# Patient Record
Sex: MALE | Race: WHITE | NOT HISPANIC OR LATINO | URBAN - METROPOLITAN AREA
[De-identification: names, ages, dates, MRNs, and addresses within clinical notes are randomized per-mention and may not be internally consistent; named-entity substitution may affect disease eponyms.]

---

## 2024-04-25 ENCOUNTER — OFFICE VISIT (OUTPATIENT)
Dept: PRIMARY CARE | Facility: CLINIC | Age: 39
End: 2024-04-25
Payer: COMMERCIAL

## 2024-04-25 VITALS
DIASTOLIC BLOOD PRESSURE: 70 MMHG | SYSTOLIC BLOOD PRESSURE: 142 MMHG | TEMPERATURE: 97.6 F | HEART RATE: 55 BPM | WEIGHT: 152 LBS | HEIGHT: 68 IN | OXYGEN SATURATION: 98 % | BODY MASS INDEX: 23.04 KG/M2

## 2024-04-25 DIAGNOSIS — F41.9 ANXIETY: ICD-10-CM

## 2024-04-25 DIAGNOSIS — N52.9 ERECTILE DYSFUNCTION, UNSPECIFIED ERECTILE DYSFUNCTION TYPE: Primary | ICD-10-CM

## 2024-04-25 DIAGNOSIS — Z31.41 FERTILITY TESTING: ICD-10-CM

## 2024-04-25 PROCEDURE — 99203 OFFICE O/P NEW LOW 30 MIN: CPT | Performed by: FAMILY MEDICINE

## 2024-04-25 PROCEDURE — 1036F TOBACCO NON-USER: CPT | Performed by: FAMILY MEDICINE

## 2024-04-25 RX ORDER — BUSPIRONE HYDROCHLORIDE 7.5 MG/1
7.5 TABLET ORAL 2 TIMES DAILY
Qty: 60 TABLET | Refills: 2 | Status: SHIPPED | OUTPATIENT
Start: 2024-04-25

## 2024-04-25 ASSESSMENT — ANXIETY QUESTIONNAIRES
GAD7 TOTAL SCORE: 6
3. WORRYING TOO MUCH ABOUT DIFFERENT THINGS: SEVERAL DAYS
5. BEING SO RESTLESS THAT IT IS HARD TO SIT STILL: SEVERAL DAYS
2. NOT BEING ABLE TO STOP OR CONTROL WORRYING: SEVERAL DAYS
1. FEELING NERVOUS, ANXIOUS, OR ON EDGE: SEVERAL DAYS
6. BECOMING EASILY ANNOYED OR IRRITABLE: SEVERAL DAYS
4. TROUBLE RELAXING: SEVERAL DAYS
IF YOU CHECKED OFF ANY PROBLEMS ON THIS QUESTIONNAIRE, HOW DIFFICULT HAVE THESE PROBLEMS MADE IT FOR YOU TO DO YOUR WORK, TAKE CARE OF THINGS AT HOME, OR GET ALONG WITH OTHER PEOPLE: NOT DIFFICULT AT ALL
7. FEELING AFRAID AS IF SOMETHING AWFUL MIGHT HAPPEN: NOT AT ALL

## 2024-04-25 ASSESSMENT — ENCOUNTER SYMPTOMS
OCCASIONAL FEELINGS OF UNSTEADINESS: 0
DEPRESSION: 0
LOSS OF SENSATION IN FEET: 0

## 2024-04-25 NOTE — ASSESSMENT & PLAN NOTE
Almost certainly secondary to anxiety. Check lipids due to FMHx father early CAD, and chest testosterone on his request.  
May consider BuSpar. He is open to trying this.  
Discharged

## 2024-04-25 NOTE — PATIENT INSTRUCTIONS
Monitor your resting blood pressure (BP) and heart rate (HR) at home. Resting BP should be fairly consistently better than 140/90, preferably better than 130/80. Please bring your blood pressure cuff to your appointments.  For a list of validated BP cuffs: https://www.validatebp.org/  Dermatology  Dr. Sharon Robins, Celestine Farfan CNP PhD, et al. ([place]) [phone]    [facility]  Dr. Irwin Rajan NP-C  Brisa Velasco PA-C  Zee Hunter FNP-C  [address] [place] [address]  [phone]    Dr. Jami Galo PA-C  Katlyn Choi NP-C  [address] [place], [place]  [phone]    [facility]  [address], [address]  [place] [address]  [phone])    [address], [address]  [place] [address]  [phone]    [address] [place] [address], [address]  [place], [place] [address] [phone]     Advise that your and your wife follow-up with reproductive endocrinology regarding fertility concerns.      Please return for a(n) follow-up appointment and Wellness visit in 2-3 months, after tests to review results and options, earlier if any question or concern.    Avoid taking Biotin for a week prior to any blood tests, as it can interfere with certain results. Fasting for labs means 12 hours, nothing to eat or drink, except water and medications, unless directed otherwise.    For assistance with scheduling referrals or consultations, please call [phone]. For laboratory, radiology, and other tests, please call [phone] ([phone] for pediatrics). Please review prescription inserts and published information for possible adverse effects of all medications. Return after testing or consultation to review results and recommendations, if symptoms persist, change, worsen, or return, or if you have any question or concern. If you do not get results within 7-10 days, or you have any question or concern, please send a message,  call the office ([phone]), or return to the office for a follow-up appointment. For non-emergencies, you may call the office. For emergencies, call 9-1-1 or go to the nearest Emergency Department. Please schedule additional appointment(s) to address concern(s) not addressed today.    In general, results are not released or discussed over the telephone, but at an appointment or via  [employer]. Results of tests done through [facility] are released via  [employer] (see below).  [email or web address]   [employer] support line: [phone]

## 2024-04-25 NOTE — PROGRESS NOTES
"Subjective   Patient ID: Devon Venegas is a 38 y.o. male who presents for Establish Care (Pt presents as a new pt to establish care, pt is concerned possible infertility, pt states he has a lot of anxiety that causes this, has not happened since Nov/Dec, no meds. BL).  HPI Historian(s): Self    Last PCP Dr. Fountain last seen 10-12y ago.  Saw urology in 2018.    He and wife trying to get pregnant for about a year. Is anxious about \"performing under pressure\" during his wife's fertile periods. c/o decreased libido. Has difficulty achieving erections only when intercourse is \"planned.\"      Review of Systems   All other systems reviewed and are negative.  Palpitations  Kidney, bladder, prostate problem  Frequent back pain  Nervousness  Take medication for nerves  Tired even after a good nights sleep  Fertility concerns      Objective   /70   Pulse 55   Temp 36.4 °C (97.6 °F)   Ht 1.727 m (5' 8\")   Wt 68.9 kg (152 lb)   SpO2 98%   BMI 23.11 kg/m²     Physical Exam  Vitals and nursing note reviewed.   Constitutional:       General: He is not in acute distress.     Appearance: He is not diaphoretic.   HENT:      Head: Normocephalic and atraumatic.   Cardiovascular:      Rate and Rhythm: Normal rate and regular rhythm.      Heart sounds: Normal heart sounds.   Pulmonary:      Effort: Pulmonary effort is normal.      Breath sounds: Normal breath sounds.   Abdominal:      General: Bowel sounds are normal. There is no distension.      Palpations: Abdomen is soft. There is no mass.      Tenderness: There is no abdominal tenderness. There is no guarding or rebound.   Genitourinary:     Penis: Normal and circumcised.       Testes: Normal.      Epididymis:      Right: Normal.      Left: Normal.      Dinesh stage (genital): 5.   Musculoskeletal:      Right lower leg: No edema.      Left lower leg: No edema.   Skin:     General: Skin is warm and dry.   Neurological:      General: No focal deficit present.      Mental " Status: He is alert. Mental status is at baseline.   Psychiatric:         Mood and Affect: Mood is anxious.         Thought Content: Thought content normal.         Assessment/Plan   Problem List Items Addressed This Visit       Anxiety     May consider BuSpar. He is open to trying this.         Relevant Medications    busPIRone (Buspar) 7.5 mg tablet    Other Relevant Orders    Follow Up In Primary Care - Health Maintenance    Erectile dysfunction - Primary     Almost certainly secondary to anxiety. Check lipids due to FMHx father early CAD, and chest testosterone on his request.         Relevant Orders    Lipid Panel    Comprehensive Metabolic Panel    CBC    Follow Up In Primary Care - Health Maintenance    Fertility testing    Relevant Orders    Testosterone    FSH    Luteinizing Hormone    POCT Semen Analysis Complete with Strict Morphology    Follow Up In Primary Care - Health Maintenance

## 2024-04-30 ENCOUNTER — LAB (OUTPATIENT)
Dept: LAB | Facility: LAB | Age: 39
End: 2024-04-30
Payer: COMMERCIAL

## 2024-04-30 DIAGNOSIS — Z31.41 FERTILITY TESTING: ICD-10-CM

## 2024-04-30 DIAGNOSIS — N52.9 ERECTILE DYSFUNCTION, UNSPECIFIED ERECTILE DYSFUNCTION TYPE: ICD-10-CM

## 2024-04-30 LAB
ALBUMIN SERPL BCP-MCNC: 4.9 G/DL (ref 3.4–5)
ALP SERPL-CCNC: 56 U/L (ref 33–120)
ALT SERPL W P-5'-P-CCNC: 19 U/L (ref 10–52)
ANION GAP SERPL CALC-SCNC: 14 MMOL/L (ref 10–20)
AST SERPL W P-5'-P-CCNC: 22 U/L (ref 9–39)
BILIRUB SERPL-MCNC: 0.7 MG/DL (ref 0–1.2)
BUN SERPL-MCNC: 14 MG/DL (ref 6–23)
CALCIUM SERPL-MCNC: 9.6 MG/DL (ref 8.6–10.3)
CHLORIDE SERPL-SCNC: 102 MMOL/L (ref 98–107)
CHOLEST SERPL-MCNC: 200 MG/DL (ref 0–199)
CHOLESTEROL/HDL RATIO: 4.1
CO2 SERPL-SCNC: 30 MMOL/L (ref 21–32)
CREAT SERPL-MCNC: 1.15 MG/DL (ref 0.5–1.3)
EGFRCR SERPLBLD CKD-EPI 2021: 85 ML/MIN/1.73M*2
ERYTHROCYTE [DISTWIDTH] IN BLOOD BY AUTOMATED COUNT: 12.9 % (ref 11.5–14.5)
FSH SERPL-ACNC: 3.6 IU/L
GLUCOSE SERPL-MCNC: 104 MG/DL (ref 74–99)
HCT VFR BLD AUTO: 47 % (ref 41–52)
HDLC SERPL-MCNC: 49.2 MG/DL
HGB BLD-MCNC: 15.5 G/DL (ref 13.5–17.5)
LDLC SERPL CALC-MCNC: 135 MG/DL
LH SERPL-ACNC: 3.2 IU/L
MCH RBC QN AUTO: 30.7 PG (ref 26–34)
MCHC RBC AUTO-ENTMCNC: 33 G/DL (ref 32–36)
MCV RBC AUTO: 93 FL (ref 80–100)
NON HDL CHOLESTEROL: 151 MG/DL (ref 0–149)
NRBC BLD-RTO: 0 /100 WBCS (ref 0–0)
PLATELET # BLD AUTO: 265 X10*3/UL (ref 150–450)
POTASSIUM SERPL-SCNC: 4.6 MMOL/L (ref 3.5–5.3)
PROT SERPL-MCNC: 7.3 G/DL (ref 6.4–8.2)
RBC # BLD AUTO: 5.05 X10*6/UL (ref 4.5–5.9)
SODIUM SERPL-SCNC: 141 MMOL/L (ref 136–145)
TESTOST SERPL-MCNC: 642 NG/DL (ref 240–1000)
TRIGL SERPL-MCNC: 81 MG/DL (ref 0–149)
VLDL: 16 MG/DL (ref 0–40)
WBC # BLD AUTO: 6.4 X10*3/UL (ref 4.4–11.3)

## 2024-04-30 PROCEDURE — 83002 ASSAY OF GONADOTROPIN (LH): CPT

## 2024-04-30 PROCEDURE — 36415 COLL VENOUS BLD VENIPUNCTURE: CPT

## 2024-04-30 PROCEDURE — 83001 ASSAY OF GONADOTROPIN (FSH): CPT

## 2024-04-30 PROCEDURE — 80053 COMPREHEN METABOLIC PANEL: CPT

## 2024-04-30 PROCEDURE — 84403 ASSAY OF TOTAL TESTOSTERONE: CPT

## 2024-04-30 PROCEDURE — 80061 LIPID PANEL: CPT

## 2024-04-30 PROCEDURE — 85027 COMPLETE CBC AUTOMATED: CPT

## 2024-07-23 ENCOUNTER — TELEPHONE (OUTPATIENT)
Dept: PRIMARY CARE | Facility: CLINIC | Age: 39
End: 2024-07-23
Payer: COMMERCIAL

## 2024-07-23 DIAGNOSIS — F41.9 ANXIETY: ICD-10-CM

## 2024-07-23 NOTE — TELEPHONE ENCOUNTER
MEDICATION REFILL    Pt only has about a day / day and a half left of the Rx.     Pharmacy Name:    [facility] / [place]    Medication requested                    Buspirone   7.5 mg   Dosage     1 tab 2 x daily  Quantity     90 days   Allergies    none given  Date of last visit   04/25/2024  Date of Next Appointment   11/06/2024

## 2024-07-24 RX ORDER — BUSPIRONE HYDROCHLORIDE 7.5 MG/1
7.5 TABLET ORAL 2 TIMES DAILY
Qty: 180 TABLET | Refills: 0 | Status: SHIPPED | OUTPATIENT
Start: 2024-07-24

## 2024-08-21 ENCOUNTER — APPOINTMENT (OUTPATIENT)
Dept: PRIMARY CARE | Facility: CLINIC | Age: 39
End: 2024-08-21
Payer: COMMERCIAL

## 2024-10-31 ENCOUNTER — TELEPHONE (OUTPATIENT)
Dept: PRIMARY CARE | Facility: CLINIC | Age: 39
End: 2024-10-31

## 2024-10-31 DIAGNOSIS — Z31.41 FERTILITY TESTING: Primary | ICD-10-CM

## 2024-10-31 NOTE — TELEPHONE ENCOUNTER
Pt was told at his last ov in July that Dr Duenas was going to order a POCT Semen Analysis Complete with Strict Morphology.  Can this order be put in the system by Dr Duenas, or does the pt need a referral to specialist?

## 2024-11-01 DIAGNOSIS — Z31.41 FERTILITY TESTING: Primary | ICD-10-CM

## 2024-11-06 ENCOUNTER — APPOINTMENT (OUTPATIENT)
Dept: PRIMARY CARE | Facility: CLINIC | Age: 39
End: 2024-11-06
Payer: COMMERCIAL

## 2024-11-06 VITALS
WEIGHT: 151.13 LBS | HEIGHT: 68 IN | OXYGEN SATURATION: 96 % | DIASTOLIC BLOOD PRESSURE: 72 MMHG | BODY MASS INDEX: 22.9 KG/M2 | SYSTOLIC BLOOD PRESSURE: 122 MMHG | HEART RATE: 67 BPM

## 2024-11-06 DIAGNOSIS — F41.9 ANXIETY: ICD-10-CM

## 2024-11-06 DIAGNOSIS — N52.9 ERECTILE DYSFUNCTION, UNSPECIFIED ERECTILE DYSFUNCTION TYPE: ICD-10-CM

## 2024-11-06 DIAGNOSIS — Z82.49 FAMILY HISTORY OF EARLY CAD: ICD-10-CM

## 2024-11-06 DIAGNOSIS — E78.00 HYPERCHOLESTEROLEMIA: ICD-10-CM

## 2024-11-06 DIAGNOSIS — Z31.41 FERTILITY TESTING: ICD-10-CM

## 2024-11-06 DIAGNOSIS — R73.01 IFG (IMPAIRED FASTING GLUCOSE): Primary | ICD-10-CM

## 2024-11-06 LAB — POC HEMOGLOBIN A1C: 5.5 % (ref 4.2–6.5)

## 2024-11-06 PROCEDURE — 90471 IMMUNIZATION ADMIN: CPT | Performed by: FAMILY MEDICINE

## 2024-11-06 PROCEDURE — 83036 HEMOGLOBIN GLYCOSYLATED A1C: CPT | Performed by: FAMILY MEDICINE

## 2024-11-06 PROCEDURE — 3008F BODY MASS INDEX DOCD: CPT | Performed by: FAMILY MEDICINE

## 2024-11-06 PROCEDURE — 90715 TDAP VACCINE 7 YRS/> IM: CPT | Performed by: FAMILY MEDICINE

## 2024-11-06 PROCEDURE — 99214 OFFICE O/P EST MOD 30 MIN: CPT | Performed by: FAMILY MEDICINE

## 2024-11-06 NOTE — ASSESSMENT & PLAN NOTE
Therapeutic lifestyle changes.  Orders:    POCT glycosylated hemoglobin (Hb A1C) manually resulted

## 2024-11-06 NOTE — ASSESSMENT & PLAN NOTE
Therapeutic lifestyle changes. Discussed CT Coronary Artery Calcium Score, statin, therapeutic lifestyle changes, recheck. He wishes to focus on therapeutic lifestyle changes and recheck.   Orders:    Comprehensive Metabolic Panel; Future    Lipid Panel; Future

## 2024-11-06 NOTE — PROGRESS NOTES
"Subjective   Patient ID: Devon Venegas is a 39 y.o. male who presents for Follow-up (Pt presents for checkup had labs drawn back in April, had to reschedule for last appt. No rx's).  HPI Historian(s): Self    Generally feeling well.    Still trying to conceive. Wife did end up seeing fertility specialist 2w ago. Have been trying for about 1.5y now.    Review of Systems   All other systems reviewed and are negative.    No LMP for male patient.    Patient Care Team:  Mao Duenas DO as PCP - General (Family Medicine)  Mao Duenas DO as PCP - [facility] PCP    Current Outpatient Medications   Medication Instructions    busPIRone (BUSPAR) 7.5 mg, oral, 2 times daily       Objective   /72   Pulse 67   Ht 1.727 m (5' 8\")   Wt 68.5 kg (151 lb 2 oz)   SpO2 96%   BMI 22.98 kg/m²           Physical Exam  Vitals and nursing note reviewed.   Constitutional:       General: He is not in acute distress.     Appearance: Normal appearance.      Comments: No assistive device presently being used.   HENT:      Head: Normocephalic and atraumatic.   Eyes:      General: No scleral icterus.     Extraocular Movements: Extraocular movements intact.      Conjunctiva/sclera: Conjunctivae normal.   Pulmonary:      Effort: Pulmonary effort is normal. No respiratory distress.   Skin:     General: Skin is warm and dry.      Coloration: Skin is not jaundiced.   Neurological:      Mental Status: He is alert and oriented to person, place, and time. Mental status is at baseline.   Psychiatric:         Behavior: Behavior normal.         Assessment & Plan  IFG (impaired fasting glucose)  Therapeutic lifestyle changes.  Orders:    POCT glycosylated hemoglobin (Hb A1C) manually resulted    Hypercholesterolemia  Therapeutic lifestyle changes. Discussed CT Coronary Artery Calcium Score, statin, therapeutic lifestyle changes, recheck. He wishes to focus on therapeutic lifestyle changes and recheck.   Orders:    Comprehensive Metabolic " Panel; Future    Lipid Panel; Future    Erectile dysfunction, unspecified erectile dysfunction type  Likely psychogenic.  Orders:    Follow Up In Primary Care - Health Maintenance    Anxiety    Orders:    Follow Up In Primary Care - Health Maintenance    Fertility testing  Follow-up with fertility specialist.  Orders:    Follow Up In Primary Care - Health Maintenance    Family history of early CAD  May consider CT Coronary Artery Calcium Score, statin, etc.

## 2024-11-06 NOTE — PATIENT INSTRUCTIONS
Urology  Dr. Jose Ro, Dr. Rick Holloway ([place]) [phone]  Ariadna Myers CNP ([place]) [phone]  Dr. Hummel, Dr. Henao, Dr. Wayne, Dr. High ([place]) [phone]  Dr. Vu Khan ([place]) [phone]  Dr. Sunny Culver, Dr. Alexa Luke, Dr. Nick Madrid, Dr. Radha Lynn, et al. ([place]) [phone]  Dr. Paul Howell et al. ([place]) [phone]    Please return for a(n) follow-up appointment and Wellness visit in April 2024, after tests to review results and options, earlier if any question or concern. Please schedule additional problem-focused appointment(s) to address additional problem(s).    Recommended vaccines:  Influenza, annual  TDaP (tetanus-diphtheria-pertussis) vaccine  Avoid taking Biotin (a vitamin, shows up particularly in hair/nail supplements) for a week prior to any blood tests, as it can interfere with certain results. Fasting for labs means 12 hours, nothing to eat or drink, except water and medications, unless directed otherwise.    For assistance with scheduling referrals or consultations, please call [phone]. For laboratory, radiology, and other tests, please call [phone] ([phone] for pediatrics). Please review prescription inserts and published information for possible adverse effects of all medications. Return after testing or consultation to review results and recommendations, if symptoms persist, change, worsen, or return, or if you have any question or concern. If you do not get results within 7-10 days, or you have any question or concern, please send a message, call the office ([phone]), or return to the office for a follow-up appointment. For non-emergencies, you may call the office. For emergencies, call 9-1-1 or go to the nearest Emergency Department. Please schedule additional appointment(s) to address concern(s) not addressed today. An annual Wellness visit is strongly recommended. A Wellness visit should be  dedicated to addressing routine health maintenance matters (e.g., cancer screenings, cardiovascular screening, etc.). Problem-focused visits, typically prompted by symptoms or specific concerns, are usually conducted separately, particularly if multiple or complex problems need to be addressed.    In general, results are not released or discussed over the telephone, but at an appointment or via  [employer]. Results of tests done through [facility] are released via  [employer] (see below).  [email or web address]   [employer] support line: [phone]

## 2025-04-03 LAB
ALBUMIN SERPL-MCNC: 4.7 G/DL (ref 3.6–5.1)
ALP SERPL-CCNC: 49 U/L (ref 36–130)
ALT SERPL-CCNC: 18 U/L (ref 9–46)
ANION GAP SERPL CALCULATED.4IONS-SCNC: 10 MMOL/L (CALC) (ref 7–17)
AST SERPL-CCNC: 20 U/L (ref 10–40)
BILIRUB SERPL-MCNC: 0.9 MG/DL (ref 0.2–1.2)
BUN SERPL-MCNC: 18 MG/DL (ref 7–25)
CALCIUM SERPL-MCNC: 9.2 MG/DL (ref 8.6–10.3)
CHLORIDE SERPL-SCNC: 104 MMOL/L (ref 98–110)
CHOLEST SERPL-MCNC: 214 MG/DL
CHOLEST/HDLC SERPL: 3.6 (CALC)
CO2 SERPL-SCNC: 26 MMOL/L (ref 20–32)
CREAT SERPL-MCNC: 1.1 MG/DL (ref 0.6–1.26)
EGFRCR SERPLBLD CKD-EPI 2021: 89 ML/MIN/1.73M2
GLUCOSE SERPL-MCNC: 98 MG/DL (ref 65–99)
HDLC SERPL-MCNC: 59 MG/DL
LDLC SERPL CALC-MCNC: 139 MG/DL (CALC)
NONHDLC SERPL-MCNC: 155 MG/DL (CALC)
POTASSIUM SERPL-SCNC: 4.3 MMOL/L (ref 3.5–5.3)
PROT SERPL-MCNC: 7.2 G/DL (ref 6.1–8.1)
SODIUM SERPL-SCNC: 140 MMOL/L (ref 135–146)
TRIGL SERPL-MCNC: 67 MG/DL

## 2025-04-08 ENCOUNTER — APPOINTMENT (OUTPATIENT)
Dept: PRIMARY CARE | Facility: CLINIC | Age: 40
End: 2025-04-08
Payer: COMMERCIAL

## 2025-04-08 VITALS
BODY MASS INDEX: 22.67 KG/M2 | OXYGEN SATURATION: 96 % | SYSTOLIC BLOOD PRESSURE: 117 MMHG | DIASTOLIC BLOOD PRESSURE: 77 MMHG | WEIGHT: 149.13 LBS | HEART RATE: 68 BPM

## 2025-04-08 DIAGNOSIS — Z00.00 WELL ADULT HEALTH CHECK: Primary | ICD-10-CM

## 2025-04-08 DIAGNOSIS — R86.8 DECREASED SPERM MOTILITY: ICD-10-CM

## 2025-04-08 DIAGNOSIS — F41.9 ANXIETY: ICD-10-CM

## 2025-04-08 DIAGNOSIS — E78.00 HYPERCHOLESTEROLEMIA: ICD-10-CM

## 2025-04-08 PROCEDURE — 99395 PREV VISIT EST AGE 18-39: CPT | Performed by: FAMILY MEDICINE

## 2025-04-08 PROCEDURE — 99214 OFFICE O/P EST MOD 30 MIN: CPT | Performed by: FAMILY MEDICINE

## 2025-04-08 PROCEDURE — 1036F TOBACCO NON-USER: CPT | Performed by: FAMILY MEDICINE

## 2025-04-08 RX ORDER — BUSPIRONE HYDROCHLORIDE 10 MG/1
10 TABLET ORAL 2 TIMES DAILY
Qty: 200 TABLET | Refills: 1 | Status: SHIPPED | OUTPATIENT
Start: 2025-04-08

## 2025-04-08 ASSESSMENT — PATIENT HEALTH QUESTIONNAIRE - PHQ9
1. LITTLE INTEREST OR PLEASURE IN DOING THINGS: NOT AT ALL
SUM OF ALL RESPONSES TO PHQ9 QUESTIONS 1 AND 2: 0
2. FEELING DOWN, DEPRESSED OR HOPELESS: NOT AT ALL

## 2025-04-08 ASSESSMENT — ENCOUNTER SYMPTOMS
NERVOUS/ANXIOUS: 1
DYSPHORIC MOOD: 0
DEPRESSION: 0
LOSS OF SENSATION IN FEET: 0
OCCASIONAL FEELINGS OF UNSTEADINESS: 0

## 2025-04-08 NOTE — ASSESSMENT & PLAN NOTE
Decent control. Increase BuSpar a little.  Orders:    busPIRone (Buspar) 10 mg tablet; Take 1 tablet (10 mg) by mouth 2 times a day.

## 2025-04-08 NOTE — PATIENT INSTRUCTIONS
Please return for a(n) anxiety and medication follow-up appointment in 3-6 months, after tests to review results and options, earlier if any question or concern. Please return for your next Wellness visit in 12 months. Please schedule additional problem-focused appointment(s) to address additional problem(s).    Recommended vaccines:  Influenza, annual  Avoid taking Biotin (a vitamin, shows up particularly in hair/nail supplements) for a week prior to any blood tests, as it can interfere with certain results. Fasting for labs means 12 hours, nothing to eat or drink, except water and medications, unless directed otherwise.    For assistance with scheduling referrals or consultations, please call [phone]. For laboratory, radiology, and other tests, please call [phone] ([phone] for pediatrics). Please review prescription inserts and published information for possible adverse effects of all medications. Return after testing or consultation to review results and recommendations, if symptoms persist, change, worsen, or return, or if you have any question or concern. If you do not get results within 7-10 days, or you have any question or concern, please send a message, call the office ([phone]), or return to the office for a follow-up appointment. For non-emergencies, you may call the office. For emergencies, call 9-1-1 or go to the nearest Emergency Department. Please schedule additional appointment(s) to address concern(s) not addressed today. An annual Wellness visit is strongly recommended. A Wellness visit should be dedicated to addressing routine health maintenance matters (e.g., cancer screenings, cardiovascular screening, etc.). Problem-focused visits, typically prompted by symptoms or specific concerns, are usually conducted separately, particularly if multiple or complex problems need to be addressed.    In general, results are not released or discussed over the telephone, but at an  appointment or via  [employer]. Results of tests done through [facility] are released via  [employer] (see below).  [email or web address]   [employer] support line: [phone]

## 2025-04-08 NOTE — PROGRESS NOTES
Subjective   Patient ID: Devon Venegas is a 39 y.o. male who presents for Follow-up (Pt presents for 6 month check up, concerned of lab results, discuss not having to take anxiety medication.).  HPI Historian(s): Self    Generally feeling well.    Wife adenomyosis, and he has low sperm motility.    Reports father had high arsenic level, have not identified a source.    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 Drinks well water.    Has been taking BuSpar more consistently for last 2-3 weeks. Not sure    Review of Systems   Psychiatric/Behavioral:  Negative for dysphoric mood. The patient is nervous/anxious.    All other systems reviewed and are negative.    No LMP for male patient.    Patient Care Team:  Mao Duenas DO as PCP - General (Family Medicine)  Mao Duenas DO as PCP - [facility] PCP    Prior to Admission medications    Medication Sig Start Date End Date Taking? Authorizing Provider   busPIRone (Buspar) 7.5 mg tablet Take 1 tablet (7.5 mg) by mouth 2 times a day. 7/24/24  Yes Mao Duenas DO        Objective   /77   Pulse 68   Wt 67.6 kg (149 lb 2 oz)   SpO2 96%   BMI 22.67 kg/m²           Physical Exam  Vitals and nursing note reviewed.   Constitutional:       General: He is not in acute distress.     Appearance: Normal appearance.      Comments: No assistive device presently being used.   HENT:      Head: Normocephalic and atraumatic.   Eyes:      General: No scleral icterus.     Extraocular Movements: Extraocular movements intact.      Conjunctiva/sclera: Conjunctivae normal.   Pulmonary:      Effort: Pulmonary effort is normal. No respiratory distress.   Skin:     General: Skin is warm and dry.      Coloration: Skin is not jaundiced.   Neurological:      Mental Status: He is alert and oriented to person, place, and time. Mental status is at baseline.   Psychiatric:         Behavior: Behavior normal.         Assessment & Plan  Well adult  health check  39yM doing fairly well.       Anxiety  Decent control. Increase BuSpar a little.  Orders:    busPIRone (Buspar) 10 mg tablet; Take 1 tablet (10 mg) by mouth 2 times a day.    Hypercholesterolemia  Therapeutic lifestyle changes.        Decreased sperm motility    Orders:    Arsenic, Urine with Reflex to Fractionation; Future    Lead, Venous; Future

## 2025-10-08 ENCOUNTER — APPOINTMENT (OUTPATIENT)
Dept: PRIMARY CARE | Facility: CLINIC | Age: 40
End: 2025-10-08
Payer: COMMERCIAL